# Patient Record
Sex: MALE | Race: WHITE | NOT HISPANIC OR LATINO | Employment: FULL TIME | URBAN - METROPOLITAN AREA
[De-identification: names, ages, dates, MRNs, and addresses within clinical notes are randomized per-mention and may not be internally consistent; named-entity substitution may affect disease eponyms.]

---

## 2024-07-24 ENCOUNTER — PATIENT MESSAGE (OUTPATIENT)
Age: 39
End: 2024-07-24

## 2024-08-01 ENCOUNTER — OFFICE VISIT (OUTPATIENT)
Dept: ENDOCRINOLOGY | Facility: CLINIC | Age: 39
End: 2024-08-01
Payer: COMMERCIAL

## 2024-08-01 VITALS
HEIGHT: 67 IN | OXYGEN SATURATION: 99 % | WEIGHT: 142.6 LBS | DIASTOLIC BLOOD PRESSURE: 100 MMHG | BODY MASS INDEX: 22.38 KG/M2 | HEART RATE: 92 BPM | SYSTOLIC BLOOD PRESSURE: 140 MMHG

## 2024-08-01 DIAGNOSIS — Z96.41 INSULIN PUMP STATUS: ICD-10-CM

## 2024-08-01 DIAGNOSIS — E10.29 TYPE 1 DIABETES MELLITUS WITH MICROALBUMINURIA (HCC): Primary | ICD-10-CM

## 2024-08-01 DIAGNOSIS — E10.649 HYPOGLYCEMIA UNAWARENESS ASSOCIATED WITH TYPE 1 DIABETES MELLITUS (HCC): ICD-10-CM

## 2024-08-01 DIAGNOSIS — R80.9 TYPE 1 DIABETES MELLITUS WITH MICROALBUMINURIA (HCC): Primary | ICD-10-CM

## 2024-08-01 PROCEDURE — 99204 OFFICE O/P NEW MOD 45 MIN: CPT | Performed by: INTERNAL MEDICINE

## 2024-08-01 RX ORDER — INSULIN LISPRO 100 [IU]/ML
INJECTION, SOLUTION INTRAVENOUS; SUBCUTANEOUS
COMMUNITY
Start: 2024-07-21

## 2024-08-01 NOTE — PROGRESS NOTES
"    New Patient Progress Note      Chief Complaint   Patient presents with   • Diabetes Type 1      Referring Provider  Referral Self  No address on file     History of Present Illness:   Hung Fair is a 38 y.o. male with a history of type 1 diabetes with long term use of insulin seen to establish care for Type 1 diabetes. Formerly saw [facility] endocrinology, last in July 2023. These notes are reviewed. NO recent labs    Recalls being diagnosed with diabetes at age 6yrs.  One episode of DKA at age 17yrs    Reports complications of  possible microalbuminuria. Possible gastroparesis stated in prior note but denies early satiety or nausea at this time. Denies recent illness or hospitalizations. Denies recent severe hypoglycemic or severe hyperglycemic episodes. Denies any issues with his current regimen.       Current regimen:   Humalog in pump  [employer] 770g Guardian 3 sensor in Sept 2022. He does not have the sensors currently as he needs a new RX. He gets these from [employer] directly    Unable to download pump today. Settings are reviewed  Basal rates  Mn-6a 0.75  6a-mn 0.725    Carb ratio  Mn-aa1 15  11a-430p 12   430p-mn 10isf 50  Target    Aictive insulin time 4h    Home blood glucose readings:   Accu chek guide reviewed  9a 115  11a 173  1p 234  2p 251  47 3pm         Hypoglycemic episodes: can get as low as 37  Hypoglycemia symptoms: does not get significant  symptoms until the 20s; lighheaded \"everything gets dark\" feeling irritable. Can be worse with his work  Treatment of hypoglycemia: orange juice      Diabetes education: YES  Diet:  2-3 meals per day, light breakfast but a lunch and dinner with snacks after dinner. Will drink water and diet iced tea. No soda    Activity: works as a              Ophthalmology: sees yearly for DM but has had a cataracts removed; \" a little\" retinopathy,   Podiatry:  self care       Thyroid disorders: no prior problems  Denies celiac disease       Patient " "Active Problem List   Diagnosis   • Type 1 diabetes mellitus with microalbuminuria (HCC)   • Microalbuminuria   • Insulin pump status   • Hypoglycemia unawareness associated with type 1 diabetes mellitus (HCC)      Past Medical History:   Diagnosis Date   • Diabetes mellitus type 1 (HCC) 1991      Past Surgical History:   Procedure Laterality Date   • CATARACT EXTRACTION Left 2010   • SALIVARY GLAND SURGERY  2012      History reviewed. No pertinent family history.  Social History     Tobacco Use   • Smoking status: Every Day     Current packs/day: 0.50     Types: Cigarettes   • Smokeless tobacco: Never   Substance Use Topics   • Alcohol use: Yes     Comment: socially     No Known Allergies      Current Outpatient Medications:   •  HumaLOG 100 UNIT/ML injection, , Disp: , Rfl:   Review of Systems    Physical Exam:  Body mass index is 22.33 kg/m².  /100   Pulse 92   Ht 5' 7\" (1.702 m)   Wt 64.7 kg (142 lb 9.6 oz)   SpO2 99%   BMI 22.33 kg/m²    Wt Readings from Last 3 Encounters:   08/01/24 64.7 kg (142 lb 9.6 oz)       GEN: NAD  E/n/m nl facies, hearing intact bilat, tongue midline, lips nl  Eyes: no stare or proptosis, nl lids , EOMI     CV; heart reg rate s1s2 nl, no m/r/g appreciated, no TITUS  Resp: CTAB, good effort  Ab+BS  Neuro: no tremor  MS: no c/c in digits, moves all 4 ext, nl muscle bulk, gait nl  Skin: warm and dry, no palmar erythema  Psych: nl mood and affect, no gross lapses in memory  Patient's shoes and socks removed.    Right Foot/Ankle   Right Foot Inspection  Skin Exam: skin intact, dry skin, callus and callus. No warmth, no erythema, no maceration, no abnormal color, no pre-ulcer and no ulcer.     Toe Exam: ROM and strength within normal limits.     Sensory   Vibration: diminished  Monofilament testing: intact    Vascular  Capillary refills: < 3 seconds  The right DP pulse is 2+.     Left Foot/Ankle  Left Foot Inspection  Skin Exam: skin intact, dry skin and callus. No warmth, no " "erythema, no maceration, normal color, no pre-ulcer and no ulcer.     Toe Exam: ROM and strength within normal limits.     Sensory   Vibration: intact  Monofilament testing: intact    Vascular  Capillary refills: < 3 seconds  The left DP pulse is 2+.     Assign Risk Category  No loss of protective sensation  No weak pulses          Labs:   Lab Results   Component Value Date    HGBA1C 6.7 (H) 06/08/2022          Lab Results   Component Value Date    CREATININE 1.12 07/25/2023    CREATININE 1.11 06/08/2022    BUN 16 07/25/2023    K 4.4 07/25/2023     07/25/2023    CO2 27 07/25/2023     eGFRcr   Date Value Ref Range Status   07/25/2023 86 >59 Final         No results found for: \"CHOL\", \"HDL\", \"LDLCAL\", \"TRIG\", \"CHOLHDL\"    Lab Results   Component Value Date    ALT 21 07/25/2023    AST 19 07/25/2023    ALKPHOS 47 07/25/2023         Impression:  1. Type 1 diabetes mellitus with microalbuminuria (HCC)    2. Insulin pump status    3. Hypoglycemia unawareness associated with type 1 diabetes mellitus (HCC)         There are no diagnoses linked to this encounter.     Plan:    Hung was seen today for diabetes type 1.    Diagnoses and all orders for this visit:    Type 1 diabetes mellitus with microalbuminuria (HCC)  -     Albumin / creatinine urine ratio; Future  -     Comprehensive metabolic panel; Future  -     Hemoglobin A1C; Future  -     TSH, 3rd generation; Future  -     Lipid panel; Future  -     Albumin / creatinine urine ratio  -     Comprehensive metabolic panel  -     Hemoglobin A1C  -     TSH, 3rd generation  -     Lipid panel    Insulin pump status    Hypoglycemia unawareness associated with type 1 diabetes mellitus (HCC)         T1DM, uncontrolled with hypoglycemia: Very interested in getting his sensor back. Asked that he have [employer] fax a new order to our office for us to complete. Also gets his tubing, cartridges etc... from [employer]. NO changes in pump settings at this time.     Possibile urine " microalbuminuria: due for repeat. Also has a labor intensive job so advised that he not get the urine specimen after a work day        Discussed with the patient and all questioned fully answered. He will call me if any problems arise.    Counseled patient on diagnostic results, prognosis, risk and benefit of treatment options, instruction for management, importance of treatment compliance, Risk  factor reduction and impressions      Tasha Sofia MD

## 2024-08-02 PROBLEM — E10.649 HYPOGLYCEMIA UNAWARENESS ASSOCIATED WITH TYPE 1 DIABETES MELLITUS (HCC): Status: ACTIVE | Noted: 2024-08-02

## 2024-08-09 ENCOUNTER — TELEPHONE (OUTPATIENT)
Dept: ENDOCRINOLOGY | Facility: CLINIC | Age: 39
End: 2024-08-09

## 2024-08-09 DIAGNOSIS — R80.9 TYPE 1 DIABETES MELLITUS WITH MICROALBUMINURIA (HCC): Primary | ICD-10-CM

## 2024-08-09 DIAGNOSIS — E10.29 TYPE 1 DIABETES MELLITUS WITH MICROALBUMINURIA (HCC): Primary | ICD-10-CM

## 2024-08-09 RX ORDER — INSULIN LISPRO 100 [IU]/ML
INJECTION, SOLUTION INTRAVENOUS; SUBCUTANEOUS
OUTPATIENT
Start: 2024-08-09

## 2024-08-09 RX ORDER — INSULIN LISPRO 100 [IU]/ML
INJECTION, SOLUTION INTRAVENOUS; SUBCUTANEOUS
Qty: 20 ML | Refills: 4 | Status: SHIPPED | OUTPATIENT
Start: 2024-08-09

## 2024-08-09 NOTE — TELEPHONE ENCOUNTER
Patient called he was in last week to see the provider and asked if a refill of his insulin could be called in.  Patient asked for generic if possible because he pays out of pocket.  The refill was never sent in and he is completely out.  Can someone call the patient if there is a problem with this.

## 2024-08-09 NOTE — TELEPHONE ENCOUNTER
Patient calling back. States he has not had any insulin since last night and BS is  Call placed to office and spoke to Erendira. Please advise, thank you.

## 2024-08-09 NOTE — TELEPHONE ENCOUNTER
Received call from call center that patient needs insulin (he is on a pump) and has been without it for a day. Could you please send the prescription for his insulin to his pharmacy?     Thanks

## 2025-03-13 DIAGNOSIS — R80.9 TYPE 1 DIABETES MELLITUS WITH MICROALBUMINURIA (HCC): ICD-10-CM

## 2025-03-13 DIAGNOSIS — E10.29 TYPE 1 DIABETES MELLITUS WITH MICROALBUMINURIA (HCC): ICD-10-CM

## 2025-03-13 RX ORDER — INSULIN LISPRO 100 [IU]/ML
INJECTION, SOLUTION INTRAVENOUS; SUBCUTANEOUS
Qty: 20 ML | Refills: 1 | Status: SHIPPED | OUTPATIENT
Start: 2025-03-13 | End: 2025-03-17 | Stop reason: SDUPTHER

## 2025-03-13 NOTE — TELEPHONE ENCOUNTER
Reason for call:   [x] Refill   [] Prior Auth  [] Other:     Office:   [] PCP/Provider     [x] Specialty/Provider - Tasha Sofia MD    Medication: HumaLOG 100 UNIT/ML injection    Dose/Frequency: Use 50-60 units of insulin perday with insulin pump    Quantity:  20 mL    Pharmacy: [facility]/pharmacy #[id number] - [place], [place] - [address] [facility] [facility]   Does the patient have enough for 3 days?   [] Yes   [x] No - Send as HP to POD    Mail Away Pharmacy   Does the patient have enough for 10 days?   [] Yes   [] No - Send as HP to POD

## 2025-03-14 RX ORDER — INSULIN LISPRO 100 [IU]/ML
INJECTION, SOLUTION INTRAVENOUS; SUBCUTANEOUS
Qty: 20 ML | Refills: 0 | OUTPATIENT
Start: 2025-03-14

## 2025-03-17 DIAGNOSIS — R80.9 TYPE 1 DIABETES MELLITUS WITH MICROALBUMINURIA (HCC): ICD-10-CM

## 2025-03-17 DIAGNOSIS — E10.29 TYPE 1 DIABETES MELLITUS WITH MICROALBUMINURIA (HCC): ICD-10-CM

## 2025-03-17 RX ORDER — INSULIN LISPRO 100 [IU]/ML
INJECTION, SOLUTION INTRAVENOUS; SUBCUTANEOUS
Qty: 20 ML | Refills: 1 | Status: SHIPPED | OUTPATIENT
Start: 2025-03-17

## 2025-04-22 ENCOUNTER — APPOINTMENT (OUTPATIENT)
Dept: URGENT CARE | Facility: CLINIC | Age: 40
End: 2025-04-22

## 2025-07-19 PROBLEM — G40.409 TONIC-CLONIC GENERALIZED SEIZURE (HCC): Status: ACTIVE | Noted: 2025-07-19

## 2025-07-19 PROBLEM — R73.9 HYPERGLYCEMIA: Status: ACTIVE | Noted: 2025-07-19

## 2025-07-19 PROBLEM — F11.11 OPIOID ABUSE, IN REMISSION (HCC): Status: ACTIVE | Noted: 2025-07-19

## 2025-07-19 PROBLEM — H26.9 CATARACT OF LEFT EYE: Status: ACTIVE | Noted: 2025-07-19

## 2025-07-19 PROBLEM — H26.9 CATARACT OF LEFT EYE: Status: RESOLVED | Noted: 2025-07-19 | Resolved: 2025-07-19

## 2025-07-21 NOTE — PROGRESS NOTES
Name: Hung Fair      : 1985      MRN: [id number]  Encounter Provider: Tr Whitten DO  Encounter Date: 2025   Encounter department: [facility] [place]  :  Assessment & Plan  Type 1 diabetes mellitus without complication (HCC)    Lab Results   Component Value Date    HGBA1C 6.7 (H) 2022       Orders:    Ambulatory Referral to Endocrinology; Future    UA w Reflex to Microscopic w Reflex to Culture; Future    Hemoglobin A1C; Future    Albumin / creatinine urine ratio; Future    Comprehensive metabolic panel; Future    CBC and differential; Future    Elevated blood pressure reading in office with diagnosis of hypertension    Orders:    Vitamin D 25 hydroxy; Future    TSH, 3rd generation with Free T4 reflex; Future    Lipid panel; Future    Need for hepatitis C screening test    Orders:    Hepatitis C antibody; Future    Type 1 diabetes mellitus with microalbuminuria (HCC)    Lab Results   Component Value Date    HGBA1C 6.7 (H) 2022       Orders:    insulin lispro (HumaLOG) 100 units/mL injection; Use 50-60 units of insulin perday with insulin pump           History of Present Illness   Patient is a 39-year-old male presented to the office to establish care and for referral and refill medication.  Patient is a type I diabetic with medical history including opiate abuse in remission, current half pack a day cigarette smoker, elevated blood pressure.  Patient has been without insurance and has not seen a primary care provider or his endocrinologist in about a year.  He currently has insulin pump but needs a refill on his      Review of Systems   Constitutional:  Negative for chills and fever.   HENT:  Negative for ear pain and sore throat.    Eyes:  Negative for pain and visual disturbance.   Respiratory:  Negative for cough and shortness of breath.    Cardiovascular:  Negative for chest pain and palpitations.   Gastrointestinal:  Negative for abdominal pain and  vomiting.   Genitourinary:  Negative for dysuria and hematuria.   Musculoskeletal:  Negative for arthralgias and back pain.   Skin:  Negative for color change and rash.   Neurological:  Negative for seizures and syncope.   All other systems reviewed and are negative.      Objective   There were no vitals taken for this visit.     Physical Exam  Vitals and nursing note reviewed.   Constitutional:       General: He is not in acute distress.     Appearance: He is well-developed.   HENT:      Head: Normocephalic and atraumatic.     Eyes:      Conjunctiva/sclera: Conjunctivae normal.       Cardiovascular:      Rate and Rhythm: Normal rate and regular rhythm.      Heart sounds: No murmur heard.  Pulmonary:      Effort: Pulmonary effort is normal. No respiratory distress.      Breath sounds: Normal breath sounds.   Abdominal:      Palpations: Abdomen is soft.      Tenderness: There is no abdominal tenderness.     Musculoskeletal:         General: No swelling.      Cervical back: Neck supple.     Skin:     General: Skin is warm and dry.      Capillary Refill: Capillary refill takes less than 2 seconds.     Neurological:      Mental Status: He is alert.     Psychiatric:         Mood and Affect: Mood normal.

## 2025-07-23 ENCOUNTER — OFFICE VISIT (OUTPATIENT)
Age: 40
End: 2025-07-23
Payer: COMMERCIAL

## 2025-07-23 VITALS
WEIGHT: 161 LBS | BODY MASS INDEX: 25.27 KG/M2 | SYSTOLIC BLOOD PRESSURE: 140 MMHG | OXYGEN SATURATION: 98 % | TEMPERATURE: 98.8 F | HEIGHT: 67 IN | HEART RATE: 64 BPM | DIASTOLIC BLOOD PRESSURE: 82 MMHG

## 2025-07-23 DIAGNOSIS — I10 ELEVATED BLOOD PRESSURE READING IN OFFICE WITH DIAGNOSIS OF HYPERTENSION: ICD-10-CM

## 2025-07-23 DIAGNOSIS — R80.9 TYPE 1 DIABETES MELLITUS WITH MICROALBUMINURIA (HCC): ICD-10-CM

## 2025-07-23 DIAGNOSIS — Z11.59 NEED FOR HEPATITIS C SCREENING TEST: ICD-10-CM

## 2025-07-23 DIAGNOSIS — E10.29 TYPE 1 DIABETES MELLITUS WITH MICROALBUMINURIA (HCC): ICD-10-CM

## 2025-07-23 DIAGNOSIS — E10.9 TYPE 1 DIABETES MELLITUS WITHOUT COMPLICATION (HCC): Primary | ICD-10-CM

## 2025-07-23 PROCEDURE — 99204 OFFICE O/P NEW MOD 45 MIN: CPT | Performed by: INTERNAL MEDICINE

## 2025-07-23 RX ORDER — INSULIN LISPRO 100 [IU]/ML
INJECTION, SOLUTION INTRAVENOUS; SUBCUTANEOUS
Qty: 20 ML | Refills: 5 | Status: SHIPPED | OUTPATIENT
Start: 2025-07-23

## 2025-07-23 NOTE — ASSESSMENT & PLAN NOTE
Lab Results   Component Value Date    HGBA1C 6.7 (H) 06/08/2022       Orders:    insulin lispro (HumaLOG) 100 units/mL injection; Use 50-60 units of insulin perday with insulin pump

## 2025-07-25 ENCOUNTER — TELEPHONE (OUTPATIENT)
Age: 40
End: 2025-07-25

## 2025-07-25 LAB
25(OH)D2 SERPL-MCNC: NORMAL NG/ML
25(OH)D3 SERPL-MCNC: NORMAL NG/ML
25(OH)D3+25(OH)D2 SERPL-MCNC: NORMAL NG/ML
ALBUMIN SERPL-MCNC: 4.5 G/DL (ref 4.1–5.1)
ALBUMIN/CREAT UR: 153 MG/G CREAT (ref 0–29)
ALP SERPL-CCNC: 65 IU/L (ref 44–121)
ALT SERPL-CCNC: 15 IU/L (ref 0–44)
APPEARANCE UR: CLEAR
AST SERPL-CCNC: 20 IU/L (ref 0–40)
BACTERIA URNS QL MICRO: NORMAL
BASOPHILS # BLD AUTO: 0 X10E3/UL (ref 0–0.2)
BASOPHILS NFR BLD AUTO: 0 %
BILIRUB SERPL-MCNC: 0.5 MG/DL (ref 0–1.2)
BILIRUB UR QL STRIP: NEGATIVE
BUN SERPL-MCNC: 22 MG/DL (ref 6–24)
BUN/CREAT SERPL: 22 (ref 9–20)
CALCIUM SERPL-MCNC: 9.6 MG/DL (ref 8.7–10.2)
CASTS URNS QL MICRO: NORMAL /LPF
CHLORIDE SERPL-SCNC: 103 MMOL/L (ref 96–106)
CHOLEST SERPL-MCNC: 173 MG/DL (ref 100–199)
CO2 SERPL-SCNC: 20 MMOL/L (ref 20–29)
COLOR UR: YELLOW
CREAT SERPL-MCNC: 0.98 MG/DL (ref 0.76–1.27)
EGFR: 100 ML/MIN/1.73
EOSINOPHIL # BLD AUTO: 0.1 X10E3/UL (ref 0–0.4)
EOSINOPHIL NFR BLD AUTO: 2 %
EPI CELLS #/AREA URNS HPF: NORMAL /HPF (ref 0–10)
ERYTHROCYTE [DISTWIDTH] IN BLOOD BY AUTOMATED COUNT: 12.8 % (ref 11.6–15.4)
GLOBULIN SER-MCNC: 2.8 G/DL (ref 1.5–4.5)
GLUCOSE SERPL-MCNC: 178 MG/DL (ref 70–99)
GLUCOSE UR QL: ABNORMAL
HBA1C MFR BLD: 6.7 % (ref 4.8–5.6)
HCT VFR BLD AUTO: 48.6 % (ref 37.5–51)
HCV AB S/CO SERPL IA: NON REACTIVE
HDLC SERPL-MCNC: 53 MG/DL
HGB BLD-MCNC: 16.1 G/DL (ref 13–17.7)
HGB UR QL STRIP: NEGATIVE
IMM GRANULOCYTES # BLD: 0 X10E3/UL (ref 0–0.1)
IMM GRANULOCYTES NFR BLD: 0 %
KETONES UR QL STRIP: NEGATIVE
LDLC SERPL CALC-MCNC: 100 MG/DL (ref 0–99)
LEUKOCYTE ESTERASE UR QL STRIP: NEGATIVE
LYMPHOCYTES # BLD AUTO: 2.1 X10E3/UL (ref 0.7–3.1)
LYMPHOCYTES NFR BLD AUTO: 38 %
MCH RBC QN AUTO: 29.8 PG (ref 26.6–33)
MCHC RBC AUTO-ENTMCNC: 33.1 G/DL (ref 31.5–35.7)
MCV RBC AUTO: 90 FL (ref 79–97)
MICRO URNS: ABNORMAL
MICROALBUMIN UR-MCNC: 197.5 UG/ML
MONOCYTES # BLD AUTO: 0.4 X10E3/UL (ref 0.1–0.9)
MONOCYTES NFR BLD AUTO: 7 %
NEUTROPHILS # BLD AUTO: 2.8 X10E3/UL (ref 1.4–7)
NEUTROPHILS NFR BLD AUTO: 53 %
NITRITE UR QL STRIP: NEGATIVE
PH UR STRIP: 5.5 [PH] (ref 5–7.5)
PLATELET # BLD AUTO: 198 X10E3/UL (ref 150–450)
POTASSIUM SERPL-SCNC: 4.1 MMOL/L (ref 3.5–5.2)
PROT SERPL-MCNC: 7.3 G/DL (ref 6–8.5)
PROT UR QL STRIP: ABNORMAL
RBC # BLD AUTO: 5.4 X10E6/UL (ref 4.14–5.8)
RBC #/AREA URNS HPF: NORMAL /HPF (ref 0–2)
SL AMB URINALYSIS REFLEX: ABNORMAL
SL AMB VLDL CHOLESTEROL CALC: 20 MG/DL (ref 5–40)
SODIUM SERPL-SCNC: 141 MMOL/L (ref 134–144)
SP GR UR: 1.03 (ref 1–1.03)
TRIGL SERPL-MCNC: 114 MG/DL (ref 0–149)
TSH SERPL DL<=0.005 MIU/L-ACNC: 1.57 UIU/ML (ref 0.45–4.5)
UROBILINOGEN UR STRIP-ACNC: 0.2 MG/DL (ref 0.2–1)
WBC # BLD AUTO: 5.4 X10E3/UL (ref 3.4–10.8)
WBC #/AREA URNS HPF: NORMAL /HPF (ref 0–5)

## 2025-07-25 NOTE — TELEPHONE ENCOUNTER
Called patient via referral from PCP. Patient is already an established patient of Dr. Sofia, last seen 8/1/2024. Left Message for patient to call back and schedule follow up appointment.

## 2025-07-30 LAB
25(OH)D2 SERPL-MCNC: <1 NG/ML
25(OH)D3 SERPL-MCNC: 32 NG/ML
25(OH)D3+25(OH)D2 SERPL-MCNC: 32 NG/ML